# Patient Record
Sex: FEMALE | Race: WHITE | URBAN - METROPOLITAN AREA
[De-identification: names, ages, dates, MRNs, and addresses within clinical notes are randomized per-mention and may not be internally consistent; named-entity substitution may affect disease eponyms.]

---

## 2021-07-12 ENCOUNTER — OFFICE VISIT (OUTPATIENT)
Dept: URBAN - METROPOLITAN AREA CLINIC 2 | Facility: CLINIC | Age: 61
End: 2021-07-12

## 2021-08-02 ENCOUNTER — OFFICE VISIT (OUTPATIENT)
Dept: URBAN - METROPOLITAN AREA CLINIC 98 | Facility: CLINIC | Age: 61
End: 2021-08-02
Payer: MEDICARE

## 2021-08-02 ENCOUNTER — WEB ENCOUNTER (OUTPATIENT)
Dept: URBAN - METROPOLITAN AREA CLINIC 98 | Facility: CLINIC | Age: 61
End: 2021-08-02

## 2021-08-02 VITALS
WEIGHT: 142.4 LBS | BODY MASS INDEX: 22.88 KG/M2 | HEART RATE: 94 BPM | HEIGHT: 66 IN | TEMPERATURE: 97.3 F | DIASTOLIC BLOOD PRESSURE: 69 MMHG | SYSTOLIC BLOOD PRESSURE: 106 MMHG

## 2021-08-02 DIAGNOSIS — R13.19 OTHER DYSPHAGIA: ICD-10-CM

## 2021-08-02 DIAGNOSIS — K12.30 MUCOSITIS: ICD-10-CM

## 2021-08-02 DIAGNOSIS — R13.10 ODYNOPHAGIA: ICD-10-CM

## 2021-08-02 PROCEDURE — 99214 OFFICE O/P EST MOD 30 MIN: CPT | Performed by: INTERNAL MEDICINE

## 2021-08-02 NOTE — HPI-TODAY'S VISIT:
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  on Rituxan  also with metastatic carcinoid on lanreotide.   has oral mucositis . crying in office today.  "I hate myself" food gets stuck.  lost a lot of weight.   taking medication and magic mouthwash  since October , sores in mouth - bx : ulcers in mouth last 8 weeks - with odynophagia - cannot drink . eating rice ; spaghetti - small amounts.  says she has lost weight 50 lbs  . MEDS: synthroid, valcyte, pantoprazole 20mg bid famotidine 20mg ; chlorhexidine ; lidocaine jelly ; sucralfate qid ; magic magicmouthwash ; lanreotide

## 2021-08-06 ENCOUNTER — TELEPHONE ENCOUNTER (OUTPATIENT)
Dept: URBAN - METROPOLITAN AREA CLINIC 98 | Facility: CLINIC | Age: 61
End: 2021-08-06

## 2021-08-06 PROBLEM — 95361005: Status: ACTIVE | Noted: 2021-08-06

## 2021-08-09 ENCOUNTER — LAB OUTSIDE AN ENCOUNTER (OUTPATIENT)
Dept: URBAN - METROPOLITAN AREA CLINIC 98 | Facility: CLINIC | Age: 61
End: 2021-08-09

## 2021-08-09 ENCOUNTER — TELEPHONE ENCOUNTER (OUTPATIENT)
Dept: URBAN - METROPOLITAN AREA CLINIC 98 | Facility: CLINIC | Age: 61
End: 2021-08-09

## 2021-08-11 ENCOUNTER — OFFICE VISIT (OUTPATIENT)
Dept: URBAN - METROPOLITAN AREA CLINIC 126 | Facility: CLINIC | Age: 61
End: 2021-08-11

## 2021-08-12 ENCOUNTER — TELEPHONE ENCOUNTER (OUTPATIENT)
Dept: URBAN - METROPOLITAN AREA CLINIC 98 | Facility: CLINIC | Age: 61
End: 2021-08-12

## 2021-08-13 ENCOUNTER — LAB OUTSIDE AN ENCOUNTER (OUTPATIENT)
Dept: URBAN - METROPOLITAN AREA CLINIC 98 | Facility: CLINIC | Age: 61
End: 2021-08-13

## 2021-08-18 PROBLEM — 30233002: Status: ACTIVE | Noted: 2021-08-06

## 2021-09-24 ENCOUNTER — OFFICE VISIT (OUTPATIENT)
Dept: URBAN - METROPOLITAN AREA MEDICAL CENTER 28 | Facility: MEDICAL CENTER | Age: 61
End: 2021-09-24
Payer: MEDICARE

## 2021-09-24 DIAGNOSIS — K31.89 ACQUIRED DEFORMITY OF DUODENUM: ICD-10-CM

## 2021-09-24 DIAGNOSIS — K29.60 ADENOPAPILLOMATOSIS GASTRICA: ICD-10-CM

## 2021-09-24 DIAGNOSIS — R13.19 CERVICAL DYSPHAGIA: ICD-10-CM

## 2021-09-24 DIAGNOSIS — K22.8 OTHER SPECIFIED DISEASES OF ESOPHAGUS: ICD-10-CM

## 2021-09-24 PROCEDURE — 43239 EGD BIOPSY SINGLE/MULTIPLE: CPT | Performed by: INTERNAL MEDICINE

## 2023-12-11 ENCOUNTER — OFFICE VISIT (OUTPATIENT)
Dept: URBAN - METROPOLITAN AREA CLINIC 2 | Facility: CLINIC | Age: 63
End: 2023-12-11

## 2023-12-11 ENCOUNTER — OFFICE VISIT (OUTPATIENT)
Dept: URBAN - METROPOLITAN AREA CLINIC 19 | Facility: CLINIC | Age: 63
End: 2023-12-11

## 2023-12-15 ENCOUNTER — DASHBOARD ENCOUNTERS (OUTPATIENT)
Age: 63
End: 2023-12-15

## 2023-12-15 ENCOUNTER — CLAIMS CREATED FROM THE CLAIM WINDOW (OUTPATIENT)
Dept: URBAN - METROPOLITAN AREA CLINIC 2 | Facility: CLINIC | Age: 63
End: 2023-12-15
Payer: MEDICARE

## 2023-12-15 VITALS
WEIGHT: 170.8 LBS | HEIGHT: 66 IN | HEART RATE: 73 BPM | TEMPERATURE: 96.7 F | BODY MASS INDEX: 27.45 KG/M2 | SYSTOLIC BLOOD PRESSURE: 116 MMHG | DIASTOLIC BLOOD PRESSURE: 73 MMHG

## 2023-12-15 DIAGNOSIS — Z80.0 FAMILY HISTORY OF COLON CANCER: ICD-10-CM

## 2023-12-15 DIAGNOSIS — Z12.11 COLON CANCER SCREENING: ICD-10-CM

## 2023-12-15 PROCEDURE — 99242 OFF/OP CONSLTJ NEW/EST SF 20: CPT | Performed by: NURSE PRACTITIONER

## 2023-12-15 PROCEDURE — 99202 OFFICE O/P NEW SF 15 MIN: CPT | Performed by: NURSE PRACTITIONER

## 2023-12-15 NOTE — HPI-TODAY'S VISIT:
This patient was referred by Dr. Felipa Taylor for an evaluation for colon cancer screening.  A copy of this will be sent to the referring provider.Patient's last colonoscopy was 2018.  No polyps.  She does have a family history of colon cancer in her father and her sister.  Patient has lymphoma and has a history of carcinoid tumor on her liver.  She denies any changes in bowel habits.  Denies abdominal pain or overt GI bleeding.

## 2024-03-19 ENCOUNTER — LAB (OUTPATIENT)
Dept: URBAN - METROPOLITAN AREA CLINIC 4 | Facility: CLINIC | Age: 64
End: 2024-03-19
Payer: MEDICARE

## 2024-03-19 ENCOUNTER — COLON (OUTPATIENT)
Dept: URBAN - METROPOLITAN AREA SURGERY CENTER 19 | Facility: SURGERY CENTER | Age: 64
End: 2024-03-19
Payer: MEDICARE

## 2024-03-19 DIAGNOSIS — Z80.0 FAMILY HISTORY OF MALIGNANT NEOPLASM OF DIGESTIVE ORGANS: ICD-10-CM

## 2024-03-19 DIAGNOSIS — K63.89 OTHER SPECIFIED DISEASES OF INTESTINE: ICD-10-CM

## 2024-03-19 DIAGNOSIS — Z12.11 ENCOUNTER FOR SCREENING FOR MALIGNANT NEOPLASM OF COLON: ICD-10-CM

## 2024-03-19 PROCEDURE — 45385 COLONOSCOPY W/LESION REMOVAL: CPT | Performed by: STUDENT IN AN ORGANIZED HEALTH CARE EDUCATION/TRAINING PROGRAM

## 2024-03-19 PROCEDURE — 45380 COLONOSCOPY AND BIOPSY: CPT | Performed by: STUDENT IN AN ORGANIZED HEALTH CARE EDUCATION/TRAINING PROGRAM

## 2024-03-19 PROCEDURE — 88305 TISSUE EXAM BY PATHOLOGIST: CPT | Performed by: PATHOLOGY
